# Patient Record
Sex: MALE | Race: WHITE | Employment: OTHER | URBAN - NONMETROPOLITAN AREA
[De-identification: names, ages, dates, MRNs, and addresses within clinical notes are randomized per-mention and may not be internally consistent; named-entity substitution may affect disease eponyms.]

---

## 2022-01-25 ENCOUNTER — TELEPHONE (OUTPATIENT)
Dept: PAIN MANAGEMENT | Age: 57
End: 2022-01-25

## 2022-01-25 NOTE — TELEPHONE ENCOUNTER
I spoke to pt by phone regarding New Ref to Pain mgmt received from [facility], CNP, for neck pain, hx cervical fusion. Pt said his dad is on hospice and would like me to call him back in a couple weeks.

## 2022-01-26 RX ORDER — IBUPROFEN 200 MG
TABLET ORAL EVERY 6 HOURS PRN
COMMUNITY

## 2022-01-26 RX ORDER — TRAZODONE HYDROCHLORIDE 50 MG/1
TABLET ORAL
COMMUNITY
Start: 2021-12-16

## 2022-01-26 RX ORDER — TADALAFIL 10 MG/1
TABLET ORAL
COMMUNITY
Start: 2021-12-16

## 2022-01-26 RX ORDER — TIZANIDINE 4 MG/1
TABLET ORAL
COMMUNITY
Start: 2021-11-03

## 2022-01-26 RX ORDER — PITAVASTATIN CALCIUM 2.09 MG/1
TABLET, FILM COATED ORAL
COMMUNITY
Start: 2022-01-03

## 2022-03-16 ENCOUNTER — OFFICE VISIT (OUTPATIENT)
Dept: PAIN MANAGEMENT | Age: 57
End: 2022-03-16
Payer: COMMERCIAL

## 2022-03-16 VITALS — DIASTOLIC BLOOD PRESSURE: 82 MMHG | SYSTOLIC BLOOD PRESSURE: 124 MMHG

## 2022-03-16 DIAGNOSIS — M96.1 POST LAMINECTOMY SYNDROME: ICD-10-CM

## 2022-03-16 DIAGNOSIS — M50.20 PROTRUDED CERVICAL DISC: ICD-10-CM

## 2022-03-16 DIAGNOSIS — M54.12 CERVICAL RADICULOPATHY: Primary | ICD-10-CM

## 2022-03-16 PROCEDURE — 1036F TOBACCO NON-USER: CPT | Performed by: PHYSICAL MEDICINE & REHABILITATION

## 2022-03-16 PROCEDURE — 3017F COLORECTAL CA SCREEN DOC REV: CPT | Performed by: PHYSICAL MEDICINE & REHABILITATION

## 2022-03-16 PROCEDURE — 99204 OFFICE O/P NEW MOD 45 MIN: CPT | Performed by: PHYSICAL MEDICINE & REHABILITATION

## 2022-03-16 PROCEDURE — G8484 FLU IMMUNIZE NO ADMIN: HCPCS | Performed by: PHYSICAL MEDICINE & REHABILITATION

## 2022-03-16 PROCEDURE — G8427 DOCREV CUR MEDS BY ELIG CLIN: HCPCS | Performed by: PHYSICAL MEDICINE & REHABILITATION

## 2022-03-16 PROCEDURE — G8421 BMI NOT CALCULATED: HCPCS | Performed by: PHYSICAL MEDICINE & REHABILITATION

## 2022-03-16 ASSESSMENT — ENCOUNTER SYMPTOMS
NAUSEA: 0
CONSTIPATION: 0
EYES NEGATIVE: 1
BACK PAIN: 1
ALLERGIC/IMMUNOLOGIC NEGATIVE: 1
RESPIRATORY NEGATIVE: 1

## 2022-03-16 NOTE — PROGRESS NOTES
PAIN MANAGEMENTNEW PATIENT CONSULTATION  3/16/22    Boucher Master  1965    ReferringProvider:  No referring provider defined for this encounter. Primary Care Physician:  Aung Page [facility]  [id number] [facility]    HPIinformation obtained from the patient as well as the Comprehensive Pain ManagementHealth Questionnaire filled out by the patient. The questionnaire will be scannedinto the electronic chart and PMH, PSH, meds, and allergies will be updates accordingly. Subjective:       CHIEF COMPLAINT:  This is a64 y.o. 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  fully     Location: Neck  Location Modifier: Right  Severity of Pain: 2  Duration of Pain: Intermittent  Frequency of Pain: Intermittent  Aggravating Factors: Stretching, Bending, Straightening, Standing, Walking, Stairs, Exercise, Kneeling and Squatting  Limiting Behavior: no  Relieving Factors: Heat, Cold and Medication -  Result of Injury: no  Work Related Injury: no  [place] of Worker Compensation Case: no      Prior evaluation:    Previous lower back problems:No    Previous workup: No   History of surgery in painful area:No    Previous pain medication trials have included:       Opioids: -     NSAID's: advil 1-2  Prn doesn't like to take     Muscle relaxants: -     Neuropathicpain meds: -    Previous Pain Management Physician: No   Nerve blocks, spinal injections:No   Typeof Pain Intervention -. Date of last Pain Intervention . Was there pain relief from Pain Intervention: No.    How long was your pain relief from the Pain Intervention 2days. mouth every 6 hours as needed for Pain       No facility-administered medications prior to visit. Past Medical History:   Diagnosis Date    Cervical disc herniation     Chronic neck pain     Erectile dysfunction     Hearing loss     History of febrile seizure     Hyperlipidemia     Meningitis     Osteoarthritis     Sinusitis, chronic     Sleep apnea     CPAP       Past Surgical History:   Procedure Laterality Date    CERVICAL FUSION      C6-7 Ant discectomy arthrodesis and instrumentation       No family history on file. Social History     Socioeconomic History    Marital status:      Spouse name: None    Number of children: None    Years of education: None    Highest education level: None   Occupational History    None   Tobacco Use    Smoking status: Former Smoker     Packs/day: 1.00     Years: 1.00     Pack years: 1.00     Quit date: 1987     Years since quittin.2    Smokeless tobacco: Never Used   Substance and Sexual Activity    Alcohol use: Yes     Alcohol/week: 1.0 standard drink     Types: 1 Cans of beer per week    Drug use: Not Currently    Sexual activity: None   Other Topics Concern    None   Social History Narrative    None     Social Determinants of Health     Financial Resource Strain:     Difficulty of Paying Living Expenses: Not on file   Food Insecurity:     Worried About Running Out of Food in the Last Year: Not on file    Angel of Food in the Last Year: Not on file   Transportation Needs:     Lack of Transportation (Medical): Not on file    Lack of Transportation (Non-Medical):  Not on file   Physical Activity:     Days of Exercise per Week: Not on file    Minutes of Exercise per Session: Not on file   Stress:     Feeling of Stress : Not on file   Social Connections:     Frequency of Communication with Friends and Family: Not on file    Frequency of Social Gatherings with Friends and Family: Not on file    Attends Zoroastrianism Services: Not on file   [facility] [place] or Organizations: Not on file    Attends Club or Organization Meetings: Not on file    Marital Status: Not on file   Intimate Partner Violence:     Fear of Current or Ex-Partner: Not on file    Emotionally Abused: Not on file    Physically Abused: Not on file    Sexually Abused: Not on file   Housing Stability:     Unable to Pay for Housing in the Last Year: Not on file    Number of [place] in the Last Year: Not on file    Unstable Housing in the Last Year: Not on file         Objective:     Physical Exam:  Vitals:    03/16/22 1324   BP: 124/82          Physical Exam  Constitutional:       Appearance: He is well-developed. HENT:      Head: Normocephalic and atraumatic. Cardiovascular:      Pulses: Normal pulses. Comments: Warm extremities. Normal capillary refill. Pulmonary:      Effort: Pulmonary effort is normal.   Abdominal:      General: Abdomen is flat. Palpations: Abdomen is soft. Musculoskeletal:      Lumbar back: Negative right straight leg raise test and negative left straight leg raise test.   Skin:     General: Skin is warm and dry. Neurological:      Mental Status: He is alert and oriented to person, place, and time. Cranial Nerves: No cranial nerve deficit. Sensory: No sensory deficit. Motor: No atrophy or abnormal muscle tone. Deep Tendon Reflexes: Reflexes are normal and symmetric. Psychiatric:         Speech: Speech normal.         Behavior: Behavior normal.         Back Exam     Tenderness   The patient is experiencing tenderness in the cervical (-  occipital tender r or l  - facet tender equivocal  Spurlings R ).     Range of Motion   Extension: normal   Flexion: normal   Lateral bend right: normal   Lateral bend left: normal   Rotation right: normal   Rotation left: normal     Muscle Strength   Right Quadriceps:  5/5   Left Quadriceps:  5/5   Right Hamstrings:  5/5   Left Hamstrings:  5/5     Tests   Straight leg raise right: negative  Straight leg raise left: negative    Other   Toe walk: normal  Heel walk: normal  Sensation: normal  Gait: normal              Labs: No results found for: WBC, HGB, HCT, PLT, CHOL, TRIG, HDL, LDLDIRECT, ALT, AST, NA, K, CL, CREATININE, BUN, CO2, TSH, PSA, INR, GLUF, LABA1C, LABMICR    Assessment: This is a 62 y.o. male with the following diagnosis:     Pain Diagnoses:  No diagnosis found. Medical/ Psychological Comorbidities:  As listed in the past medical and surgical history    Functional Limitations secondary to the above problems:  Chronic painlimits function and quality of life    Plan:   I do not perform C3-4 4-5  TFEs   consider  R C3-4 4-5  Facet steroid/RFA    will   Go back to surgeon   1. Meds:   New Prescriptions    No medications on file      No orders of the defined types were placed in this encounter. Controlled Substances Monitoring:    [place] was reviewed for [place], [place]. Pt educated about the risks of taking opiates, including increasedsedation, constipation, slowed breathing, tolerance, dependence, and addiction. No orders of the defined types were placed in this encounter. No follow-ups on file. The patient expressed understanding of the above assessment and plan. Totaltime spent face to face with patient was 30 minutes inwhich  50% or more of the time was spent in counseling, education about risk andbenefits of the above plan, and coordination of care.